# Patient Record
Sex: FEMALE | Race: WHITE | Employment: FULL TIME | URBAN - METROPOLITAN AREA
[De-identification: names, ages, dates, MRNs, and addresses within clinical notes are randomized per-mention and may not be internally consistent; named-entity substitution may affect disease eponyms.]

---

## 2017-01-07 PROCEDURE — 86480 TB TEST CELL IMMUN MEASURE: CPT | Performed by: FAMILY MEDICINE

## 2017-01-07 PROCEDURE — 36415 COLL VENOUS BLD VENIPUNCTURE: CPT | Performed by: FAMILY MEDICINE

## 2017-03-07 DIAGNOSIS — G43.711 INTRACTABLE CHRONIC MIGRAINE WITHOUT AURA AND WITH STATUS MIGRAINOSUS: Primary | ICD-10-CM

## 2017-03-08 NOTE — TELEPHONE ENCOUNTER
Medication: Topamax     Date of last refill: 5/2/16   [place]    Last office visit: 4/12/16  Due back to clinic per last office note: once Botox PA ed. Date next office visit scheduled: Patient cancelled Botox appt.

## 2017-03-21 NOTE — TELEPHONE ENCOUNTER
Attempted to reach patient but there was no answer and mailbox is full. Left a message with patient spouse to have patient give us a call back. Please help patient make appointment.

## 2017-03-22 RX ORDER — TOPIRAMATE 50 MG/1
TABLET, FILM COATED ORAL
Qty: 180 TABLET | Refills: 0 | OUTPATIENT
Start: 2017-03-22

## 2017-03-22 NOTE — TELEPHONE ENCOUNTER
Pt scheduled f/up for 3/31 in [facility]. Pt said she has enough medication to get her to appt. If she needs more she will contact office.

## 2017-03-31 ENCOUNTER — OFFICE VISIT (OUTPATIENT)
Dept: NEUROLOGY | Facility: CLINIC | Age: 52
End: 2017-03-31

## 2017-03-31 VITALS
SYSTOLIC BLOOD PRESSURE: 102 MMHG | WEIGHT: 137 LBS | HEART RATE: 66 BPM | RESPIRATION RATE: 18 BRPM | DIASTOLIC BLOOD PRESSURE: 62 MMHG | BODY MASS INDEX: 25 KG/M2

## 2017-03-31 DIAGNOSIS — G43.711 INTRACTABLE CHRONIC MIGRAINE WITHOUT AURA AND WITH STATUS MIGRAINOSUS: Primary | ICD-10-CM

## 2017-03-31 DIAGNOSIS — D35.2 PITUITARY ADENOMA (HCC): ICD-10-CM

## 2017-03-31 PROCEDURE — 99215 OFFICE O/P EST HI 40 MIN: CPT | Performed by: OTHER

## 2017-03-31 RX ORDER — TOPIRAMATE 50 MG/1
50 TABLET, FILM COATED ORAL 2 TIMES DAILY
Qty: 180 TABLET | Refills: 3 | Status: SHIPPED | OUTPATIENT
Start: 2017-03-31 | End: 2018-11-24

## 2017-03-31 NOTE — PATIENT INSTRUCTIONS
Refill policies:    • Allow 2 business days for refills; controlled substances may take longer.   • Contact your pharmacy at least 5 days prior to running out of medication and have them send an electronic request or submit request through the “request re insurance carrier to obtain pre-certification or prior authorization. Unfortunately, [facility] has seen an increase in denial of payment even though the procedure/test has been pre-certified.   You are strongly encouraged to contact your insurance carrier to v

## 2017-03-31 NOTE — PROGRESS NOTES
[facility] Neurology outpatient progress note  Date of service: 3/31/2017    Patient here for follow up for migraines. States headache is well controlled with current dose of topamax, denies side effect from topamax, she is on bromocriptine prescribed by lawrence ISAACS or equivalent per week         Comment: occasionally     Family History   Problem Relation Age of Onset   • Cancer Father      LUNG & COPD, SMOKING   • Lipids Mother    • Migraines Mother    • [place] Mother    • Stroke Mother    • Arrhythmia

## 2018-02-02 PROCEDURE — 84146 ASSAY OF PROLACTIN: CPT | Performed by: PHYSICIAN ASSISTANT

## 2018-02-02 PROCEDURE — 84305 ASSAY OF SOMATOMEDIN: CPT | Performed by: PHYSICIAN ASSISTANT

## 2018-02-19 ENCOUNTER — TELEPHONE (OUTPATIENT)
Dept: NEUROLOGY | Facility: CLINIC | Age: 53
End: 2018-02-19

## 2018-02-19 NOTE — TELEPHONE ENCOUNTER
Received Cancer registry form via mail from the [facility] requesting last follow up date and most current information on patient. Form initiated and placed on Dr Deana Finch desk for signature.      Once signed, form to be mailed in self [employer]

## 2018-03-07 PROCEDURE — 88175 CYTOPATH C/V AUTO FLUID REDO: CPT | Performed by: OBSTETRICS & GYNECOLOGY

## 2018-03-07 PROCEDURE — 87624 HPV HI-RISK TYP POOLED RSLT: CPT | Performed by: OBSTETRICS & GYNECOLOGY

## 2018-03-25 DIAGNOSIS — G43.711 INTRACTABLE CHRONIC MIGRAINE WITHOUT AURA AND WITH STATUS MIGRAINOSUS: ICD-10-CM

## 2018-03-26 NOTE — TELEPHONE ENCOUNTER
Due for yearly appt. Will sent Dormzy message and inform her that we would make sure she has enough medication to last until appt.      Medication: Topamax    Date of last refill: 3/31/17 for #180/3 additional refills  [place]

## 2018-03-27 NOTE — TELEPHONE ENCOUNTER
Left message for patient to give us a call back. Please help patient make an appointment. Then we can refill medication to the appointment date.

## 2018-04-05 RX ORDER — TOPIRAMATE 50 MG/1
TABLET, FILM COATED ORAL
Qty: 180 TABLET | OUTPATIENT
Start: 2018-04-05

## 2018-07-17 PROCEDURE — 84146 ASSAY OF PROLACTIN: CPT | Performed by: PHYSICIAN ASSISTANT

## 2018-07-17 PROCEDURE — 36415 COLL VENOUS BLD VENIPUNCTURE: CPT | Performed by: PHYSICIAN ASSISTANT

## 2018-11-21 PROCEDURE — 86480 TB TEST CELL IMMUN MEASURE: CPT | Performed by: FAMILY MEDICINE

## 2018-11-24 DIAGNOSIS — G43.711 INTRACTABLE CHRONIC MIGRAINE WITHOUT AURA AND WITH STATUS MIGRAINOSUS: ICD-10-CM

## 2018-11-26 NOTE — TELEPHONE ENCOUNTER
Sent the patient a IPDIA message to schedul an appointment before medication can be approved.        Medication: topiramate 50 MG Oral Tab     Date of last refill: 03/31/17 (#180/3)  [place]    Last office visit: 03

## 2018-12-04 ENCOUNTER — TELEPHONE (OUTPATIENT)
Dept: NEUROLOGY | Facility: CLINIC | Age: 53
End: 2018-12-04

## 2018-12-04 RX ORDER — TOPIRAMATE 50 MG/1
50 TABLET, FILM COATED ORAL 2 TIMES DAILY
Qty: 90 TABLET | Refills: 0 | Status: SHIPPED | OUTPATIENT
Start: 2018-12-04 | End: 2019-01-08

## 2019-01-08 ENCOUNTER — OFFICE VISIT (OUTPATIENT)
Dept: NEUROLOGY | Facility: CLINIC | Age: 54
End: 2019-01-08
Payer: COMMERCIAL

## 2019-01-08 VITALS — RESPIRATION RATE: 16 BRPM | HEART RATE: 66 BPM | SYSTOLIC BLOOD PRESSURE: 100 MMHG | DIASTOLIC BLOOD PRESSURE: 62 MMHG

## 2019-01-08 DIAGNOSIS — G43.711 INTRACTABLE CHRONIC MIGRAINE WITHOUT AURA AND WITH STATUS MIGRAINOSUS: ICD-10-CM

## 2019-01-08 DIAGNOSIS — D35.2 PITUITARY ADENOMA (HCC): Primary | ICD-10-CM

## 2019-01-08 PROCEDURE — 99215 OFFICE O/P EST HI 40 MIN: CPT | Performed by: OTHER

## 2019-01-08 RX ORDER — TOPIRAMATE 50 MG/1
50 TABLET, FILM COATED ORAL 2 TIMES DAILY
Qty: 180 TABLET | Refills: 3 | Status: SHIPPED | OUTPATIENT
Start: 2019-01-08 | End: 2020-03-23

## 2019-01-08 NOTE — PATIENT INSTRUCTIONS
Refill policies:    • Allow 2-3 business days for refills; controlled substances may take longer.   • Contact your pharmacy at least 5 days prior to running out of medication and have them send an electronic request or submit request through the “request re entire amount billed. Precertification and Prior Authorizations: If your physician has recommended that you have a procedure or additional testing performed.   [facility]) will contact your insurance carrier to obtain pre-certi

## 2019-01-08 NOTE — PROGRESS NOTES
[facility] Neurology outpatient progress note  Date of service: 1/8/2019    Patient here for follow up for migraine and pituitary macroadenoma which has enlarged from last year imaging repeat.  States headache is well controlled with current dose of topamax, denlinh Used    Alcohol use:  Yes      Alcohol/week: 0.0 oz      Comment: occasionally    Family History   Problem Relation Age of Onset   • Cancer Father         LUNG & COPD, SMOKING   • Lipids Mother    • Migraines Mother    • Stroke Mother    • Arrhythmia Mother counseling patient regarding all studies' results, assessment, treatment options and care plan.     Sahara Haywood MD  Neurology  [facility]  1/8/2019, 4:13 PM  Palmira Miller MD

## 2019-01-29 ENCOUNTER — TELEPHONE (OUTPATIENT)
Dept: NEUROLOGY | Facility: CLINIC | Age: 54
End: 2019-01-29

## 2019-01-29 DIAGNOSIS — Z91.041 CONTRAST MEDIA ALLERGY: Primary | ICD-10-CM

## 2019-01-29 RX ORDER — DIPHENHYDRAMINE HCL 50 MG
CAPSULE ORAL
Qty: 1 CAPSULE | Refills: 0 | Status: SHIPPED | OUTPATIENT
Start: 2019-01-29 | End: 2019-04-02 | Stop reason: ALTCHOICE

## 2019-01-29 RX ORDER — PREDNISONE 10 MG/1
TABLET ORAL
Qty: 15 TABLET | Refills: 0 | Status: SHIPPED | OUTPATIENT
Start: 2019-01-29 | End: 2019-04-02 | Stop reason: ALTCHOICE

## 2019-01-29 NOTE — TELEPHONE ENCOUNTER
Pt has contrast allergy listed on file. Confirmed with pt that she needs prep for contrast dye. Rx set up for review.      Per pt, no call back needed as long as it is sent

## 2019-01-31 ENCOUNTER — HOSPITAL ENCOUNTER (OUTPATIENT)
Dept: MRI IMAGING | Age: 54
Discharge: HOME | End: 2019-01-31
Attending: Other
Payer: COMMERCIAL

## 2019-01-31 DIAGNOSIS — D35.2 PITUITARY ADENOMA (HCC): ICD-10-CM

## 2019-01-31 DIAGNOSIS — G43.711 INTRACTABLE CHRONIC MIGRAINE WITHOUT AURA AND WITH STATUS MIGRAINOSUS: ICD-10-CM

## 2019-01-31 PROCEDURE — A9575 INJ GADOTERATE MEGLUMI 0.1ML: HCPCS | Performed by: OTHER

## 2019-01-31 PROCEDURE — 70553 MRI BRAIN STEM W/O & W/DYE: CPT | Performed by: OTHER

## 2019-02-08 ENCOUNTER — TELEPHONE (OUTPATIENT)
Dept: SURGERY | Facility: CLINIC | Age: 54
End: 2019-02-08

## 2019-03-12 ENCOUNTER — APPOINTMENT (OUTPATIENT)
Dept: HEMATOLOGY/ONCOLOGY | Facility: HOSPITAL | Age: 54
End: 2019-03-12
Attending: NEUROLOGICAL SURGERY

## 2019-03-19 ENCOUNTER — APPOINTMENT (OUTPATIENT)
Dept: HEMATOLOGY/ONCOLOGY | Facility: HOSPITAL | Age: 54
End: 2019-03-19

## 2019-03-27 ENCOUNTER — TELEPHONE (OUTPATIENT)
Dept: SURGERY | Facility: CLINIC | Age: 54
End: 2019-03-27

## 2019-03-27 NOTE — TELEPHONE ENCOUNTER
LMTCB, reminded pt of duration (3-4hrs), appt date & time, need for imaging, and location (provided address)

## 2019-04-02 ENCOUNTER — NURSE ONLY (OUTPATIENT)
Dept: HEMATOLOGY/ONCOLOGY | Facility: HOSPITAL | Age: 54
End: 2019-04-02
Attending: NEUROLOGICAL SURGERY
Payer: COMMERCIAL

## 2019-04-02 ENCOUNTER — OFFICE VISIT (OUTPATIENT)
Dept: NEUROLOGY | Facility: CLINIC | Age: 54
End: 2019-04-02
Payer: COMMERCIAL

## 2019-04-02 VITALS
DIASTOLIC BLOOD PRESSURE: 69 MMHG | WEIGHT: 122.38 LBS | OXYGEN SATURATION: 93 % | HEIGHT: 62.01 IN | RESPIRATION RATE: 18 BRPM | TEMPERATURE: 97 F | HEART RATE: 65 BPM | SYSTOLIC BLOOD PRESSURE: 108 MMHG | BODY MASS INDEX: 22.24 KG/M2

## 2019-04-02 DIAGNOSIS — D35.2 PITUITARY MACROADENOMA (HCC): Primary | ICD-10-CM

## 2019-04-02 PROCEDURE — 99211 OFF/OP EST MAY X REQ PHY/QHP: CPT

## 2019-04-02 PROCEDURE — 99204 OFFICE O/P NEW MOD 45 MIN: CPT | Performed by: PHYSICIAN ASSISTANT

## 2019-04-02 NOTE — H&P
Neurosurgery Clinic Visit  2019    Ronaldo Overton PCP:  Breann Keller MD    1965 MRN [id number]           REASON FOR VISIT: Pituitary adenoma follow up, last seen 2015, imaging review Neuro Conference      HISTORY OF PRESENT Jarrett Ledezma neurosurgery 2015, began Cabergoline 2/2015. 4/2015 Cabergoline d/c'd due to migranes and was started on Bromocriptine. Was seen 2016, was continued with Bromocriptine.  Was lost to endocrinology follow up until 2018, had been off Bromocriptine 6 - 12 month SPINE: Gait intact. Moving bilateral upper and lower extremities spontaneously to full resistance. SKIN: Warm, dry.       DIAGNOSTIC DATA: None       IMAGING:  PROCEDURE:  MRI PITUITARY (W+WO) (CPT=70553)     COMPARISON:  [place], MRI PITUITARY intracranial flow voids are present.     =====  CONCLUSION:  Heterogeneous hypo enhancing region of the right-sided pituitary gland extending into the cavernous sinus partially encasing the cavernous segment of the right internal carotid artery is stable. 1. 17 mm x 12 mm right-sided pituitary macroadenoma with invasion of the right cavernous sinus,  increased in size since November 8, 2016.  2. No evidence of suprasellar extension.       MRI PITUITARY WITHOUT AND WITH CONTRAST: 11/8/16  HISTORY/INDICAT craniopharyngeal duct (pouch) (HCC)      TECHNIQUE:  MRI imaging of the brain is performed prior to and then following IV infusion of intravenous gadolinium contrast. In addition, high-resolution multiplanar imaging of the pituitary gland is performed pre partially encasing the cavernous segment of the right internal carotid   artery. Dictated by: Johnny Haines MD on 4/24/2015 at 8:53       Approved by: Johnny Haines MD           PROCEDURE:  MR PITUITARY W/WO CONTRAST 12/11/14  COMPARISON:  None. Trace ethmoid mucosal thickening. The remainder of the visualized paranasal sinuses and mastoid air cells are unremarkable. The expected major intracranial flow voids are present.     =====  CONCLUSION:       1.  No acute intracranial abnormality identif with the patient. The patient agrees with the plan, verbalized understanding and is appreciative. All questions were sought out and thoroughly answered to satisfaction.        Total visit time: 45 min  More than 50% spent coordinating care, reviewing imagin

## 2019-05-02 NOTE — IMAGING NOTE
[email or web address] RADHA with call back number verifying information regarding MRI IV dye allergy.  [id number]

## 2019-05-03 ENCOUNTER — HOSPITAL ENCOUNTER (OUTPATIENT)
Dept: MRI IMAGING | Age: 54
Discharge: HOME | End: 2019-05-03
Attending: PHYSICIAN ASSISTANT
Payer: COMMERCIAL

## 2019-05-03 DIAGNOSIS — D35.2 PITUITARY MACROADENOMA (HCC): ICD-10-CM

## 2019-05-03 PROCEDURE — A9575 INJ GADOTERATE MEGLUMI 0.1ML: HCPCS | Performed by: PHYSICIAN ASSISTANT

## 2019-05-03 PROCEDURE — 70553 MRI BRAIN STEM W/O & W/DYE: CPT | Performed by: PHYSICIAN ASSISTANT

## 2019-05-07 ENCOUNTER — NURSE ONLY (OUTPATIENT)
Dept: HEMATOLOGY/ONCOLOGY | Facility: HOSPITAL | Age: 54
End: 2019-05-07
Attending: NEUROLOGICAL SURGERY
Payer: COMMERCIAL

## 2019-05-07 ENCOUNTER — OFFICE VISIT (OUTPATIENT)
Dept: NEUROLOGY | Facility: CLINIC | Age: 54
End: 2019-05-07
Payer: COMMERCIAL

## 2019-05-07 VITALS
OXYGEN SATURATION: 95 % | DIASTOLIC BLOOD PRESSURE: 73 MMHG | HEIGHT: 62.01 IN | BODY MASS INDEX: 22.02 KG/M2 | SYSTOLIC BLOOD PRESSURE: 106 MMHG | RESPIRATION RATE: 18 BRPM | TEMPERATURE: 97 F | WEIGHT: 121.19 LBS | HEART RATE: 77 BPM

## 2019-05-07 DIAGNOSIS — D35.2 PITUITARY MACROADENOMA (HCC): Primary | ICD-10-CM

## 2019-05-07 DIAGNOSIS — R23.2 HOT FLASHES: ICD-10-CM

## 2019-05-07 PROCEDURE — 99213 OFFICE O/P EST LOW 20 MIN: CPT | Performed by: PHYSICIAN ASSISTANT

## 2019-05-07 PROCEDURE — 99211 OFF/OP EST MAY X REQ PHY/QHP: CPT

## 2019-05-07 NOTE — PROGRESS NOTES
Neurosurgery Clinic Visit  2019    Tito Laboy PCP:  Claudia Delaney MD    1965 MRN [id number]           REASON FOR VISIT: Pituitary lesion follow up, imaging review Neuro Conference      HISTORY OF PRESENT ILLNESS:Mercedes Arriaga pituitary lesion or menopause. Just began experiencing hot flashes recently.      States endocrinology did not mention whether surgery or medication therapy was the best option.      Per records, PRL from 2009 then gap until 2015.  Regular PRL monitoring si taking differently: Take 2.5 mg by mouth daily.  ) Disp: 90 tablet Rfl: 1   Estradiol (ESTRACE) 0.1 MG/GM Vaginal Cream Use 1 g daily x 2 wks, then twice a week Disp: 1 Tube Rfl: 4     No current facility-administered medications on file prior to visit. leptomeningeal enhancement. There is no restricted diffusion to suggest acute ischemia/infarction. The visualized paranasal sinuses and mastoid air cells are unremarkable.        The expected major intracranial flow voids are present.      =====  CO the patient. The patient agrees with the plan, verbalized understanding and is appreciative. All questions were sought out and thoroughly answered to satisfaction. I scribed for Dr. Miguel Lopez.        Total visit time: > 20 min  More than 50% spent coordinating

## 2019-06-06 PROCEDURE — 84305 ASSAY OF SOMATOMEDIN: CPT | Performed by: INTERNAL MEDICINE

## 2019-11-25 ENCOUNTER — TELEPHONE (OUTPATIENT)
Dept: SURGERY | Facility: CLINIC | Age: 54
End: 2019-11-25

## 2019-11-25 DIAGNOSIS — D35.2 PITUITARY ADENOMA (HCC): Primary | ICD-10-CM

## 2019-11-25 RX ORDER — DIPHENHYDRAMINE HCL 25 MG
50 CAPSULE ORAL SEE ADMIN INSTRUCTIONS
Status: SHIPPED | OUTPATIENT
Start: 2019-11-25

## 2019-11-25 RX ORDER — PREDNISONE 20 MG/1
10 TABLET ORAL ONCE
Status: SHIPPED | OUTPATIENT
Start: 2019-11-25

## 2019-11-25 NOTE — TELEPHONE ENCOUNTER
pt having MRI on 12/3/19, allergic to iodine, always gets premedicated.  Please call in meds for patient & let her know

## 2019-11-26 NOTE — TELEPHONE ENCOUNTER
I withdrew the previous auth and resubmitted for a new auth that covers her scheduled DOS.  I called the patient and l/m vc mail (ok per verbal) that [facility]

## 2019-12-02 ENCOUNTER — TELEPHONE (OUTPATIENT)
Dept: SURGERY | Facility: CLINIC | Age: 54
End: 2019-12-02

## 2019-12-02 NOTE — TELEPHONE ENCOUNTER
Pharmacy never got rx; [facility] [address] Fax # [phone]. Please call patient if she can take it 13 hours or 24 hours before.

## 2019-12-02 NOTE — TELEPHONE ENCOUNTER
Called patient to inform prescriptions did not arrive at pharmacy. Explained to patient it would be ideal if she would reschedule being that she will require prepping.   Patient stated her MRI is not until 4:40 tomorrow afternoon leaving her adequate time

## 2019-12-03 ENCOUNTER — HOSPITAL ENCOUNTER (OUTPATIENT)
Dept: MRI IMAGING | Age: 54
Discharge: HOME | End: 2019-12-03
Attending: PHYSICIAN ASSISTANT
Payer: COMMERCIAL

## 2019-12-03 ENCOUNTER — TELEPHONE (OUTPATIENT)
Dept: NEUROLOGY | Facility: CLINIC | Age: 54
End: 2019-12-03

## 2019-12-03 DIAGNOSIS — D35.2 PITUITARY MACROADENOMA (HCC): ICD-10-CM

## 2019-12-03 PROCEDURE — A9575 INJ GADOTERATE MEGLUMI 0.1ML: HCPCS | Performed by: PHYSICIAN ASSISTANT

## 2019-12-03 PROCEDURE — 70553 MRI BRAIN STEM W/O & W/DYE: CPT | Performed by: PHYSICIAN ASSISTANT

## 2019-12-03 NOTE — TELEPHONE ENCOUNTER
Spoke to patient regarding Benadryl and Prednisone and her concern being MRI is delayed. Confirmed with Simba LOZADA, patient is fine to still have imaging, medication will remain in her system for the day.

## 2020-02-24 DIAGNOSIS — G43.711 INTRACTABLE CHRONIC MIGRAINE WITHOUT AURA AND WITH STATUS MIGRAINOSUS: ICD-10-CM

## 2020-02-24 RX ORDER — TOPIRAMATE 50 MG/1
50 TABLET, FILM COATED ORAL 2 TIMES DAILY
Qty: 180 TABLET | Refills: 3 | Status: CANCELLED | OUTPATIENT
Start: 2020-02-24

## 2020-02-24 NOTE — TELEPHONE ENCOUNTER
Left pt VM (ok per HIPAA) that she needs f/u appt.      Medication: topiramate 50 mg     Date of last refill: 1/8/19 (#180/3)  [place]    Last office visit: 1/8/19  Due back to clinic per last office note:  1 year   Da

## 2020-03-23 DIAGNOSIS — G43.711 INTRACTABLE CHRONIC MIGRAINE WITHOUT AURA AND WITH STATUS MIGRAINOSUS: ICD-10-CM

## 2020-03-23 RX ORDER — TOPIRAMATE 50 MG/1
50 TABLET, FILM COATED ORAL 2 TIMES DAILY
Qty: 180 TABLET | Refills: 0 | Status: SHIPPED | OUTPATIENT
Start: 2020-03-23 | End: 2020-05-11

## 2020-03-23 NOTE — TELEPHONE ENCOUNTER
Medication: Topamax    Date of last refill: 1/8/19 (#180/3)  [place]     Last office visit:1/8/19  Due back to clinic per last office note:  1 year  Date next office visit scheduled:    Future Appointments   Date Time Pr

## 2020-05-11 ENCOUNTER — VIRTUAL PHONE E/M (OUTPATIENT)
Dept: NEUROLOGY | Facility: CLINIC | Age: 55
End: 2020-05-11
Payer: COMMERCIAL

## 2020-05-11 DIAGNOSIS — G43.711 INTRACTABLE CHRONIC MIGRAINE WITHOUT AURA AND WITH STATUS MIGRAINOSUS: ICD-10-CM

## 2020-05-11 DIAGNOSIS — D35.2 PITUITARY ADENOMA (HCC): Primary | ICD-10-CM

## 2020-05-11 PROCEDURE — 99214 OFFICE O/P EST MOD 30 MIN: CPT | Performed by: OTHER

## 2020-05-11 RX ORDER — TOPIRAMATE 50 MG/1
50 TABLET, FILM COATED ORAL 2 TIMES DAILY
Qty: 180 TABLET | Refills: 1 | Status: SHIPPED | OUTPATIENT
Start: 2020-05-11 | End: 2021-07-19

## 2020-05-11 NOTE — PROGRESS NOTES
Virtual/Telephone Visit Note     Date of service: 5/11/2020    Renny Shruti verbally consents to a Virtual/Telephone Visit service on 5/11/2020.   Patient understands and accepts financial responsibility for any deductible, co-insurance and/or co-p • Prolactin increased    • SEASONAL ALLERGIES      Past Surgical History:   Procedure Laterality Date   • D & C     • LASIK Bilateral    •       x1,      Social History:  Social History    Tobacco Use      Smoking status: Never Smoker      Sm relationship, due to the ongoing public health crisis/national emergency and because of restrictions of visitation. There are limitations of this visit as no physical exam could be performed.   Every conscious effort was taken to allow for sufficient and a

## 2020-07-10 ENCOUNTER — TELEPHONE (OUTPATIENT)
Dept: NEUROLOGY | Facility: CLINIC | Age: 55
End: 2020-07-10

## 2020-07-10 DIAGNOSIS — Z91.041 CONTRAST MEDIA ALLERGY: Primary | ICD-10-CM

## 2020-07-10 RX ORDER — PREDNISONE 10 MG/1
TABLET ORAL
Qty: 15 TABLET | Refills: 0 | Status: SHIPPED | OUTPATIENT
Start: 2020-07-10 | End: 2020-07-23

## 2020-07-10 RX ORDER — DIPHENHYDRAMINE HCL 25 MG
TABLET ORAL
Qty: 2 TABLET | Refills: 0 | Status: SHIPPED | OUTPATIENT
Start: 2020-07-10 | End: 2020-08-07

## 2020-07-10 NOTE — IMAGING NOTE
07/10 @ 1102 Pt cannot recall reactions she developed from CT or MRI Radiology  IV contrast dye. She has always been premedicated then. Made her aware CT contrast is not the same as MRI contrast. Dr Nabor Marquez office contacted and I spoke to [facility] about this.

## 2020-07-10 NOTE — TELEPHONE ENCOUNTER
Spoke with Suhas Gutiérrez in radiology department who states patient is requesting contrast allergy protocol for MRI Pituitary that is scheduled on 7/14/20. Rx pended - see below    Take 50 mg prednisone 13, 7 and 1 hour prior to MRI.    Take 50 mg benadryl 1 hour

## 2020-07-14 ENCOUNTER — HOSPITAL ENCOUNTER (OUTPATIENT)
Dept: MRI IMAGING | Age: 55
Discharge: HOME | End: 2020-07-14
Attending: Other
Payer: COMMERCIAL

## 2020-07-14 DIAGNOSIS — G43.711 INTRACTABLE CHRONIC MIGRAINE WITHOUT AURA AND WITH STATUS MIGRAINOSUS: ICD-10-CM

## 2020-07-14 DIAGNOSIS — D35.2 PITUITARY ADENOMA (HCC): ICD-10-CM

## 2020-07-14 PROCEDURE — A9575 INJ GADOTERATE MEGLUMI 0.1ML: HCPCS | Performed by: OTHER

## 2020-07-14 PROCEDURE — 70553 MRI BRAIN STEM W/O & W/DYE: CPT | Performed by: OTHER

## 2020-07-31 PROCEDURE — 88305 TISSUE EXAM BY PATHOLOGIST: CPT | Performed by: RADIOLOGY

## 2020-08-03 PROCEDURE — 88305 TISSUE EXAM BY PATHOLOGIST: CPT | Performed by: INTERNAL MEDICINE

## 2020-12-07 ENCOUNTER — TELEPHONE (OUTPATIENT)
Dept: NEUROLOGY | Facility: CLINIC | Age: 55
End: 2020-12-07

## 2020-12-07 DIAGNOSIS — D35.2 PITUITARY ADENOMA (HCC): Primary | ICD-10-CM

## 2020-12-07 NOTE — TELEPHONE ENCOUNTER
Per PSR: Pt is requestiing an MRI pituitary order      L. O.V 5/11/2020-    Assessment & Plan:  (D35.2) Pituitary adenoma ([facility] [address]  (primary encounter diagnosis)  (G43.711) Intractable chronic migraine without aura and with status migrainosus  (primary encount

## 2020-12-08 NOTE — TELEPHONE ENCOUNTER
Message left on VM (ok per HIPAA consent)that order for MRI Pituitary has been placed and she can schedule an appointment after she hears from PA dept that it has been approved by her insurance.

## 2020-12-21 ENCOUNTER — TELEPHONE (OUTPATIENT)
Dept: NEUROLOGY | Facility: CLINIC | Age: 55
End: 2020-12-21

## 2020-12-21 DIAGNOSIS — Z91.041 CONTRAST MEDIA ALLERGY: Primary | ICD-10-CM

## 2020-12-21 DIAGNOSIS — F41.8 TEST ANXIETY: ICD-10-CM

## 2020-12-21 RX ORDER — LORAZEPAM 1 MG/1
TABLET ORAL
Qty: 2 TABLET | Refills: 0 | Status: SHIPPED | OUTPATIENT
Start: 2020-12-21 | End: 2021-01-22 | Stop reason: CLARIF

## 2020-12-21 NOTE — TELEPHONE ENCOUNTER
Attempted to call pt to relay that Rx was sent to pharmacy, mail box is full and could not leave message. Will try again later.

## 2020-12-22 NOTE — TELEPHONE ENCOUNTER
Pt returning Eryn RN's call; I relayed info to pt that her Rx is ready for  and to make sure she has someone drive her to and from her MRI. Pt understood.

## 2020-12-22 NOTE — TELEPHONE ENCOUNTER
Pt picked up her Rx but states this time is a different medication. Pt needs medication for her iodine allergy, not for relaxation. Informed pt that Pao Sands RN will have the prednisone sent to her pharmacy.

## 2020-12-22 NOTE — TELEPHONE ENCOUNTER
Attempted to call pt, again not able to reach her and could not leave message as mailbox is full. Is listed as active on [employer]. Will also try sending message there as well.

## 2020-12-23 ENCOUNTER — TELEPHONE (OUTPATIENT)
Dept: NEUROLOGY | Facility: CLINIC | Age: 55
End: 2020-12-23

## 2020-12-23 RX ORDER — PREDNISONE 10 MG/1
TABLET ORAL
Qty: 15 TABLET | Refills: 0 | Status: SHIPPED | OUTPATIENT
Start: 2020-12-23 | End: 2021-07-19 | Stop reason: ALTCHOICE

## 2020-12-23 NOTE — TELEPHONE ENCOUNTER
pt has an MRI scheduled for tomorrow; pt states that a coworker just tested positive for Covid; pt hasn't seen coworker since last Friday and they are always wearing maska

## 2020-12-31 ENCOUNTER — HOSPITAL ENCOUNTER (OUTPATIENT)
Dept: MRI IMAGING | Age: 55
Discharge: HOME | End: 2020-12-31
Attending: Other
Payer: COMMERCIAL

## 2020-12-31 DIAGNOSIS — D35.2 PITUITARY ADENOMA (HCC): ICD-10-CM

## 2020-12-31 PROCEDURE — A9575 INJ GADOTERATE MEGLUMI 0.1ML: HCPCS | Performed by: OTHER

## 2020-12-31 PROCEDURE — 70553 MRI BRAIN STEM W/O & W/DYE: CPT | Performed by: OTHER

## 2021-07-16 DIAGNOSIS — G43.711 INTRACTABLE CHRONIC MIGRAINE WITHOUT AURA AND WITH STATUS MIGRAINOSUS: ICD-10-CM

## 2021-07-16 NOTE — TELEPHONE ENCOUNTER
MAITE on VM (ok per HIPAA consent) to call back before 4pm or advised ER or UC for severe symptoms as the Topamax will not help her migraine now if she has been off of it for a while.       Medication: TOPIRAMATE 50 MG    Date of last refill: 5/11/20 (#180/

## 2021-07-16 NOTE — TELEPHONE ENCOUNTER
Pt calling for status of the topiramate, she has a migraine right now. She made an appointment for this Monday the 19th, but if you can send her a small amount to get to Monday, please do.  If not, no need to call (her phone is off now anyway.)

## 2021-07-19 ENCOUNTER — OFFICE VISIT (OUTPATIENT)
Dept: NEUROLOGY | Facility: CLINIC | Age: 56
End: 2021-07-19
Payer: COMMERCIAL

## 2021-07-19 ENCOUNTER — TELEPHONE (OUTPATIENT)
Dept: NEUROLOGY | Facility: CLINIC | Age: 56
End: 2021-07-19

## 2021-07-19 VITALS — SYSTOLIC BLOOD PRESSURE: 108 MMHG | DIASTOLIC BLOOD PRESSURE: 74 MMHG | HEART RATE: 68 BPM | RESPIRATION RATE: 16 BRPM

## 2021-07-19 DIAGNOSIS — D35.2 PITUITARY ADENOMA (HCC): Primary | ICD-10-CM

## 2021-07-19 DIAGNOSIS — G43.711 INTRACTABLE CHRONIC MIGRAINE WITHOUT AURA AND WITH STATUS MIGRAINOSUS: ICD-10-CM

## 2021-07-19 PROCEDURE — 3078F DIAST BP <80 MM HG: CPT | Performed by: OTHER

## 2021-07-19 PROCEDURE — 99214 OFFICE O/P EST MOD 30 MIN: CPT | Performed by: OTHER

## 2021-07-19 PROCEDURE — 3074F SYST BP LT 130 MM HG: CPT | Performed by: OTHER

## 2021-07-19 RX ORDER — TOPIRAMATE 50 MG/1
TABLET, FILM COATED ORAL
Qty: 180 TABLET | Refills: 1 | OUTPATIENT
Start: 2021-07-19

## 2021-07-19 RX ORDER — TOPIRAMATE 50 MG/1
50 TABLET, FILM COATED ORAL 2 TIMES DAILY
Qty: 180 TABLET | Refills: 1 | Status: CANCELLED | OUTPATIENT
Start: 2021-07-19

## 2021-07-19 RX ORDER — RIMEGEPANT SULFATE 75 MG/75MG
75 TABLET, ORALLY DISINTEGRATING ORAL AS NEEDED
Qty: 8 TABLET | Refills: 0 | Status: SHIPPED | OUTPATIENT
Start: 2021-07-19 | End: 2021-08-18

## 2021-07-19 RX ORDER — TOPIRAMATE 50 MG/1
50 TABLET, FILM COATED ORAL DAILY
Qty: 90 TABLET | Refills: 3 | Status: SHIPPED | OUTPATIENT
Start: 2021-07-19

## 2021-07-19 NOTE — PROGRESS NOTES
[facility] Neurology Outpatient Progress Note  Date of service: 7/19/2021    Patient here for a follow-up visit for migraine and history of pituitary tumor. Since last visit migraine is worse. No visual field loss. She takes only once a day topamax.  But ran out by mouth 2 (two) times daily.  (Patient not taking: Reported on 7/19/2021 ), Disp: 180 tablet, Rfl: 1  Allergies:    Gnp Iodine              ANAPHYLAXIS  Radiology Contrast *    ANAPHYLAXIS, SHORTNESS OF BREATH  Dander                      Comment:cats  Dus Endo  MRI repeat needed and see Neurosurgery after that  Orders Placed This Encounter          MRI PITUITARY (W+WO)(CPT=70553) ([facility])     RTC 1 year  Pt is advised to go ER for any new or worsening headache, visual loss or other neurological symptoms.

## 2021-07-20 DIAGNOSIS — Z91.041 CONTRAST MEDIA ALLERGY: ICD-10-CM

## 2021-07-20 NOTE — TELEPHONE ENCOUNTER
Brayden Braun MD  [address] [place] [address] Nurse  Could you please help order pre-med for pt's allergy history per radiology? Thanks,. Jaden Murillo       See TE 7/19/21, indicates that MRI repeat to be done in December. LOV states that repeat MRI needed.  Confirme

## 2021-08-04 ENCOUNTER — APPOINTMENT (OUTPATIENT)
Dept: OTHER | Facility: HOSPITAL | Age: 56
End: 2021-08-04
Attending: PREVENTIVE MEDICINE

## 2021-08-06 ENCOUNTER — APPOINTMENT (OUTPATIENT)
Dept: OTHER | Facility: HOSPITAL | Age: 56
End: 2021-08-06
Attending: PREVENTIVE MEDICINE

## 2021-11-22 RX ORDER — PREDNISONE 10 MG/1
TABLET ORAL
Qty: 15 TABLET | Refills: 0 | Status: CANCELLED | OUTPATIENT
Start: 2021-11-22

## 2021-11-23 RX ORDER — PREDNISONE 10 MG/1
TABLET ORAL
Qty: 15 TABLET | Refills: 0 | Status: SHIPPED | OUTPATIENT
Start: 2021-11-23

## 2021-12-22 ENCOUNTER — TELEPHONE (OUTPATIENT)
Dept: SURGERY | Facility: CLINIC | Age: 56
End: 2021-12-22

## 2021-12-22 DIAGNOSIS — D35.2 PITUITARY ADENOMA (HCC): Primary | ICD-10-CM

## 2021-12-22 RX ORDER — PREDNISONE 10 MG/1
TABLET ORAL
Qty: 15 TABLET | Refills: 0 | Status: SHIPPED | OUTPATIENT
Start: 2021-12-22

## 2021-12-22 NOTE — TELEPHONE ENCOUNTER
Patient would like to speak with Nurse in regards to medication prior to patients MRI scheduled for 21. Patient also states her MRI order  yesterday and is needing it extended to her appointment date.  Please contact to assist.

## 2021-12-22 NOTE — TELEPHONE ENCOUNTER
Unfortunately [facility] does not extend PA dates. I started a new PA with [facility]. New PA is now noted in referral.  Authorization # [id number] @ [address] [place] [address] dates: 12/22/21 - 01/20/22. Please advise patient she is all set with new PA.   Thank y

## 2021-12-22 NOTE — TELEPHONE ENCOUNTER
Pt states she is not sure if CT contrast and MRI contrast are the same, if so, will need pre-meds for anaphylactic reaction in past.    For PA: Can we extend auth dates? Pt was unable to schedule within window.

## 2021-12-23 NOTE — TELEPHONE ENCOUNTER
Spoke with pt. Relayed that premedication is at pharmacy and that [address] MRI now expires in 1/2022. Verbalized understanding. Is going to call radiology to make sure she needs to be premedicated.  Wanted to talk with them on what dye they will be usi

## 2021-12-23 NOTE — TELEPHONE ENCOUNTER
Pt called and would like to speak to a nurse regarding medication prescribed to take prior to MRI scheduled on 12/27/21. Please advise.

## 2023-09-19 ENCOUNTER — WORKER'S COMP (OUTPATIENT)
Dept: OCCUPATIONAL MEDICINE | Age: 58
End: 2023-09-19

## 2023-09-19 VITALS
SYSTOLIC BLOOD PRESSURE: 98 MMHG | WEIGHT: 120 LBS | HEART RATE: 68 BPM | DIASTOLIC BLOOD PRESSURE: 56 MMHG | HEIGHT: 62 IN | BODY MASS INDEX: 22.08 KG/M2

## 2023-09-19 DIAGNOSIS — M79.642 LEFT HAND PAIN: Primary | ICD-10-CM

## 2023-09-19 DIAGNOSIS — S60.00XA CONTUSION OF FINGER OF LEFT HAND, UNSPECIFIED FINGER, INITIAL ENCOUNTER: ICD-10-CM

## 2023-09-19 PROCEDURE — 99203 OFFICE O/P NEW LOW 30 MIN: CPT | Performed by: NURSE PRACTITIONER

## 2023-09-19 ASSESSMENT — PAIN SCALES - GENERAL: PAINLEVEL: 4

## 2023-09-20 ENCOUNTER — IMAGING SERVICES (OUTPATIENT)
Dept: GENERAL RADIOLOGY | Age: 58
End: 2023-09-20

## 2023-09-20 ENCOUNTER — WORKER'S COMP (OUTPATIENT)
Dept: OCCUPATIONAL MEDICINE | Age: 58
End: 2023-09-20

## 2023-09-20 ENCOUNTER — APPOINTMENT (OUTPATIENT)
Dept: OCCUPATIONAL MEDICINE | Age: 58
End: 2023-09-20

## 2023-09-20 VITALS
WEIGHT: 120 LBS | TEMPERATURE: 98.7 F | SYSTOLIC BLOOD PRESSURE: 100 MMHG | OXYGEN SATURATION: 97 % | RESPIRATION RATE: 16 BRPM | HEIGHT: 62 IN | BODY MASS INDEX: 22.08 KG/M2 | HEART RATE: 78 BPM | DIASTOLIC BLOOD PRESSURE: 62 MMHG

## 2023-09-20 DIAGNOSIS — M79.642 LEFT HAND PAIN: Primary | ICD-10-CM

## 2023-09-20 DIAGNOSIS — S62.92XA: ICD-10-CM

## 2023-09-20 DIAGNOSIS — S60.00XA CONTUSION OF FINGER OF LEFT HAND, UNSPECIFIED FINGER, INITIAL ENCOUNTER: ICD-10-CM

## 2023-09-20 DIAGNOSIS — M79.642 LEFT HAND PAIN: ICD-10-CM

## 2023-09-20 PROCEDURE — 99213 OFFICE O/P EST LOW 20 MIN: CPT | Performed by: NURSE PRACTITIONER

## 2023-09-20 PROCEDURE — A4570 SPLINT: HCPCS | Performed by: NURSE PRACTITIONER

## 2023-09-20 ASSESSMENT — PAIN SCALES - GENERAL: PAINLEVEL: 2

## 2023-11-21 ENCOUNTER — TELEPHONE (OUTPATIENT)
Dept: NEUROLOGY | Facility: CLINIC | Age: 58
End: 2023-11-21

## 2023-11-21 NOTE — TELEPHONE ENCOUNTER
Pt was last seen 7/19/2021. She scheduled first available f/u appt 2/21/2024 for migraines and pituitary. She is requesting an order for MRI PITUITARY (W+WO). She would like to complete it before the end of the year. Please advise, Pt's best call back number is [phone]. Endorsed to RN for Provider.

## 2023-11-22 NOTE — TELEPHONE ENCOUNTER
Left message for patient. Advised that patient has not been seen since 2021 and will need to have an appointment before an MRI can be ordered. Patient advised she is on the waiting list however nothing sooner is currently available.

## 2024-02-21 ENCOUNTER — OFFICE VISIT (OUTPATIENT)
Dept: NEUROLOGY | Facility: CLINIC | Age: 59
End: 2024-02-21
Payer: COMMERCIAL

## 2024-02-21 VITALS
DIASTOLIC BLOOD PRESSURE: 72 MMHG | HEART RATE: 78 BPM | HEIGHT: 62 IN | WEIGHT: 135 LBS | BODY MASS INDEX: 24.84 KG/M2 | SYSTOLIC BLOOD PRESSURE: 110 MMHG | RESPIRATION RATE: 18 BRPM | OXYGEN SATURATION: 98 %

## 2024-02-21 DIAGNOSIS — D35.2 PITUITARY ADENOMA (HCC): Primary | ICD-10-CM

## 2024-02-21 PROCEDURE — 3074F SYST BP LT 130 MM HG: CPT | Performed by: OTHER

## 2024-02-21 PROCEDURE — 3078F DIAST BP <80 MM HG: CPT | Performed by: OTHER

## 2024-02-21 PROCEDURE — 3008F BODY MASS INDEX DOCD: CPT | Performed by: OTHER

## 2024-02-21 PROCEDURE — 99244 OFF/OP CNSLTJ NEW/EST MOD 40: CPT | Performed by: OTHER

## 2024-02-21 RX ORDER — ABALOPARATIDE 2000 UG/ML
INJECTION, SOLUTION SUBCUTANEOUS
COMMUNITY

## 2024-02-21 RX ORDER — NABUMETONE 500 MG/1
1 TABLET, FILM COATED ORAL 2 TIMES DAILY
COMMUNITY

## 2024-02-21 NOTE — PROGRESS NOTES
[facility] OUTPATIENT NEUROLOGY CONSULTATION    Date of consult: 2/21/2024    Assessment:    ICD-10-CM    1. Pituitary adenoma (HCC)  D35.2 MRI PITUITARY (W+WO) (CPT=70553)        Plan:      Procedures    MRI PITUITARY (W+WO) (CPT=70553)   Endo to follow  Recommend eye exam annually  See orders and medications filed with this encounter. The patient indicates understanding of these issues and agrees with the plan.  RTC 1 year  Pt should go ER for any new or worsening symptoms and contact office     Subjective:   CC/Reason for consult: pituitary adenoma     HPI: Mercedes Bacon is a 58 year old female with past medical history as listed below presents here for initial evaluation of pituitary adenoma. Pt last saw me 4 years ago for pituitary adenoma and migraine, she has self stopped topamax due to concern of osteopenia. She is following with endocrinologist who prescribes bromocriptine; No new focal weakness, numbness, gait imbalance, vision or speech difficulties.    History/Other:   REVIEW OF SYSTEMS:  A comprehensive 14-point system was reviewed. Pertinent positives and negatives are noted in HPI.       Current Outpatient Medications:     Abaloparatide (TYMLOS) 3120 MCG/1.56ML Subcutaneous Solution Pen-injector, Inject into the skin., Disp: , Rfl:     bromocriptine 2.5 MG Oral Tab, Take 1 tablet (2.5 mg total) by mouth daily., Disp: 90 tablet, Rfl: 0    predniSONE 10 MG Oral Tab, Take Prednisone 50 mg 13 hours, 7 hours and 1 hour before exam, take 3rd prednisone along with diphenhydramine 50 mg. Do not drive., Disp: 15 tablet, Rfl: 0    predniSONE 10 MG Oral Tab, Take 5 tablets 13 hours, 7 hours, and 1 hour before MRI, Disp: 15 tablet, Rfl: 0    diphenhydrAMINE HCl 50 MG Oral Tab, Take 1 tablet 1 hour prior to MRI, Disp: 1 tablet, Rfl: 0    SIMVASTATIN 20 MG Oral Tab, TAKE 1 TABLET BY MOUTH EVERY DAY AT NIGHT, Disp: 90 tablet, Rfl: 0    Calcium-Magnesium-Zinc 333-133-5 MG Oral Tab, Take by mouth 2 (two) times a  day., Disp: , Rfl:     Multiple Vitamins-Minerals (ONE-A-DAY WOMENS OR), Take by mouth daily., Disp: , Rfl:     Cholecalciferol (VITAMIN D3) 25 MCG (1000 UT) Oral Cap, Take 1 tablet by mouth daily., Disp: , Rfl:     nabumetone 500 MG Oral Tab, Take 1 tablet (500 mg total) by mouth 2 (two) times daily., Disp: , Rfl:   Allergies:  Allergies   Allergen Reactions    Gnp Iodine ANAPHYLAXIS    Radiology Contrast Iodinated Dyes ANAPHYLAXIS and SHORTNESS OF BREATH    Dander      cats    Dust     Seasonal      Past Medical History:   Diagnosis Date    ANXIETY     Esophageal reflux     GERD     HEADACHES     MIGRAINES    Migraines     Osteopenia 02/15/2021    Other and unspecified hyperlipidemia     OTHER DISEASES     SEVERE PMS SXS    OTHER DISEASES     HX OF HIGH PROLACTIN LEVEL    Prolactin increased     SEASONAL ALLERGIES      Past Surgical History:   Procedure Laterality Date    COLONOSCOPY  2020    MAC: 3 mm rectosigmoid polyp (lymphoid aggregate), int hem, rpt     D & C      LASIK Bilateral           x1,      Social History:  Social History     Tobacco Use    Smoking status: Never    Smokeless tobacco: Never   Substance Use Topics    Alcohol use: Yes     Comment: occasionally     Family History   Problem Relation Age of Onset    Cancer Father         LUNG & COPD, SMOKING    Lipids Mother     Migraines Mother     Stroke Mother     Arrhythmia Mother     Heart Disease Sister     Other (tia) Sister      Objective:   Physical Examination:  /72   Pulse 78   Resp 18   Ht 62\"   Wt 135 lb (61.2 kg)   SpO2 98%   BMI 24.69 kg/m²   General: Awake and alert; in no acute distress  HEENT: Eye sclerae are anicteric; scalp is atraumatic  Neck: Supple  Cardiac: Regular rate and regular rhythm  Lungs: Clear   Abdomen:  non-tender  Extremities: No clubbing or cyanosis; moves extremities   Psychiatric: Normal mood and affect; answers questions appropriately  Dermatologic: No rashes; no edema  Neurological  Examination:  Language: normal   Speech: no dysarthria  CN: II-XII intact  Motor strength: 5/5 all extremities  Tone: normal  DTRs: 2+ symmetric  Coordination: Normal FTN  Sensory: symmetric   Gait: nl    Data Reviewed on 2/21/2024  Notes Reviewed on 2/21/2024  Labs Reviewed  on 2/21/2024    Hafsa Nathan MD (Michael)   Neurology  [facility]  2/21/2024, 2:34 PM  Consultation Report: being sent/fax/route to requesting provider   CC: Chong Kline MD

## 2024-04-30 ENCOUNTER — HOSPITAL ENCOUNTER (OUTPATIENT)
Dept: MRI IMAGING | Age: 59
Discharge: HOME | End: 2024-04-30
Attending: Other
Payer: COMMERCIAL

## 2024-04-30 DIAGNOSIS — D35.2 PITUITARY ADENOMA (HCC): ICD-10-CM

## 2024-04-30 PROCEDURE — A9575 INJ GADOTERATE MEGLUMI 0.1ML: HCPCS | Performed by: OTHER

## 2024-04-30 PROCEDURE — 70553 MRI BRAIN STEM W/O & W/DYE: CPT | Performed by: OTHER

## 2024-04-30 RX ORDER — GADOTERATE MEGLUMINE 376.9 MG/ML
15 INJECTION INTRAVENOUS
Status: COMPLETED | OUTPATIENT
Start: 2024-04-30 | End: 2024-04-30

## 2024-04-30 RX ADMIN — GADOTERATE MEGLUMINE 15 ML: 376.9 INJECTION INTRAVENOUS at 16:24:00

## 2025-03-14 ENCOUNTER — HOSPITAL ENCOUNTER (EMERGENCY)
Facility: HOSPITAL | Age: 60
Discharge: HOME | End: 2025-03-14
Attending: STUDENT IN AN ORGANIZED HEALTH CARE EDUCATION/TRAINING PROGRAM
Payer: COMMERCIAL

## 2025-03-14 ENCOUNTER — APPOINTMENT (OUTPATIENT)
Dept: GENERAL RADIOLOGY | Facility: HOSPITAL | Age: 60
End: 2025-03-14
Payer: COMMERCIAL

## 2025-03-14 VITALS
OXYGEN SATURATION: 100 % | BODY MASS INDEX: 24.55 KG/M2 | HEART RATE: 66 BPM | DIASTOLIC BLOOD PRESSURE: 74 MMHG | SYSTOLIC BLOOD PRESSURE: 102 MMHG | WEIGHT: 130 LBS | RESPIRATION RATE: 20 BRPM | HEIGHT: 61 IN | TEMPERATURE: 99 F

## 2025-03-14 DIAGNOSIS — R07.89 CHEST PAIN, ATYPICAL: Primary | ICD-10-CM

## 2025-03-14 LAB
ALBUMIN SERPL-MCNC: 4.8 G/DL (ref 3.2–4.8)
ALBUMIN/GLOB SERPL: 2.1 {RATIO} (ref 1–2)
ALP LIVER SERPL-CCNC: 112 U/L
ALT SERPL-CCNC: 23 U/L
ANION GAP SERPL CALC-SCNC: 7 MMOL/L (ref 0–18)
AST SERPL-CCNC: 22 U/L (ref ?–34)
ATRIAL RATE: 64 BPM
ATRIAL RATE: 73 BPM
BASOPHILS # BLD AUTO: 0.04 X10(3) UL (ref 0–0.2)
BASOPHILS NFR BLD AUTO: 0.6 %
BILIRUB SERPL-MCNC: 0.7 MG/DL (ref 0.3–1.2)
BUN BLD-MCNC: 15 MG/DL (ref 9–23)
CALCIUM BLD-MCNC: 9.6 MG/DL (ref 8.7–10.6)
CHLORIDE SERPL-SCNC: 104 MMOL/L (ref 98–112)
CO2 SERPL-SCNC: 29 MMOL/L (ref 21–32)
CREAT BLD-MCNC: 0.77 MG/DL
EGFRCR SERPLBLD CKD-EPI 2021: 89 ML/MIN/1.73M2 (ref 60–?)
EOSINOPHIL # BLD AUTO: 0.54 X10(3) UL (ref 0–0.7)
EOSINOPHIL NFR BLD AUTO: 8.1 %
ERYTHROCYTE [DISTWIDTH] IN BLOOD BY AUTOMATED COUNT: 12.5 %
GLOBULIN PLAS-MCNC: 2.3 G/DL (ref 2–3.5)
GLUCOSE BLD-MCNC: 96 MG/DL (ref 70–99)
HCT VFR BLD AUTO: 43 %
HGB BLD-MCNC: 14.5 G/DL
IMM GRANULOCYTES # BLD AUTO: 0.01 X10(3) UL (ref 0–1)
IMM GRANULOCYTES NFR BLD: 0.1 %
LYMPHOCYTES # BLD AUTO: 1.96 X10(3) UL (ref 1–4)
LYMPHOCYTES NFR BLD AUTO: 29.3 %
MCH RBC QN AUTO: 29.1 PG (ref 26–34)
MCHC RBC AUTO-ENTMCNC: 33.7 G/DL (ref 31–37)
MCV RBC AUTO: 86.2 FL
MONOCYTES # BLD AUTO: 0.43 X10(3) UL (ref 0.1–1)
MONOCYTES NFR BLD AUTO: 6.4 %
NEUTROPHILS # BLD AUTO: 3.72 X10 (3) UL (ref 1.5–7.7)
NEUTROPHILS # BLD AUTO: 3.72 X10(3) UL (ref 1.5–7.7)
NEUTROPHILS NFR BLD AUTO: 55.5 %
OSMOLALITY SERPL CALC.SUM OF ELEC: 291 MOSM/KG (ref 275–295)
P AXIS: 39 DEGREES
P AXIS: 47 DEGREES
P-R INTERVAL: 168 MS
P-R INTERVAL: 172 MS
PLATELET # BLD AUTO: 192 10(3)UL (ref 150–450)
POTASSIUM SERPL-SCNC: 4.4 MMOL/L (ref 3.5–5.1)
PROT SERPL-MCNC: 7.1 G/DL (ref 5.7–8.2)
Q-T INTERVAL: 394 MS
Q-T INTERVAL: 398 MS
QRS DURATION: 94 MS
QRS DURATION: 98 MS
QTC CALCULATION (BEZET): 410 MS
QTC CALCULATION (BEZET): 434 MS
R AXIS: 57 DEGREES
R AXIS: 70 DEGREES
RBC # BLD AUTO: 4.99 X10(6)UL
SODIUM SERPL-SCNC: 140 MMOL/L (ref 136–145)
T AXIS: 65 DEGREES
T AXIS: 69 DEGREES
TROPONIN I SERPL HS-MCNC: <3 NG/L
TROPONIN I SERPL HS-MCNC: <3 NG/L
VENTRICULAR RATE: 64 BPM
VENTRICULAR RATE: 73 BPM
WBC # BLD AUTO: 6.7 X10(3) UL (ref 4–11)

## 2025-03-14 PROCEDURE — 85025 COMPLETE CBC W/AUTO DIFF WBC: CPT

## 2025-03-14 PROCEDURE — 85025 COMPLETE CBC W/AUTO DIFF WBC: CPT | Performed by: STUDENT IN AN ORGANIZED HEALTH CARE EDUCATION/TRAINING PROGRAM

## 2025-03-14 PROCEDURE — 99285 EMERGENCY DEPT VISIT HI MDM: CPT

## 2025-03-14 PROCEDURE — 71045 X-RAY EXAM CHEST 1 VIEW: CPT

## 2025-03-14 PROCEDURE — 80053 COMPREHEN METABOLIC PANEL: CPT

## 2025-03-14 PROCEDURE — 84484 ASSAY OF TROPONIN QUANT: CPT

## 2025-03-14 PROCEDURE — 99284 EMERGENCY DEPT VISIT MOD MDM: CPT

## 2025-03-14 PROCEDURE — 93010 ELECTROCARDIOGRAM REPORT: CPT

## 2025-03-14 PROCEDURE — 93005 ELECTROCARDIOGRAM TRACING: CPT

## 2025-03-14 PROCEDURE — 36415 COLL VENOUS BLD VENIPUNCTURE: CPT

## 2025-03-14 PROCEDURE — 84484 ASSAY OF TROPONIN QUANT: CPT | Performed by: STUDENT IN AN ORGANIZED HEALTH CARE EDUCATION/TRAINING PROGRAM

## 2025-03-14 PROCEDURE — 80053 COMPREHEN METABOLIC PANEL: CPT | Performed by: STUDENT IN AN ORGANIZED HEALTH CARE EDUCATION/TRAINING PROGRAM

## 2025-03-14 NOTE — CONSULTS
[facility] Cardiology  Consultation Note      Mercedes Bacon Patient Status:  Emergency    1965 MRN [id number]   Location [facility] EMERGENCY DEPARTMENT Attending Ange Heart MD   Hosp Day # 0 PCP Chong Kline MD     Reason for consult: Chest pain    History of Present Illness:  Mercedes Bacon is a 59 year old female with HLD who presented to [facility] on 3/14/2025 with chest pain. Began earlier this morning, central nonradiating lasted 25 min then self resolved. Reports high stress. No associated symptoms such as nausea, diaphoresis, dyspnea, palps, syncope. No history of exertional symptoms.      Medications:  Current Facility-Administered Medications   Medication Dose Route Frequency    predniSONE (DELTASONE) tab 50 mg  50 mg Oral Once    diphenhydrAMINE HCl (BENADRYL) cap 50 mg  50 mg Oral Once    diphenhydrAMINE HCl (BENADRYL) cap 50 mg  50 mg Oral See Admin Instructions    predniSONE (DELTASONE) tab 10 mg  10 mg Oral Once       Past Medical History:    ANXIETY    Esophageal reflux    GERD    HEADACHES    MIGRAINES    Migraines    Osteopenia    Other and unspecified hyperlipidemia    OTHER DISEASES    SEVERE PMS SXS    OTHER DISEASES    HX OF HIGH PROLACTIN LEVEL    Prolactin increased    SEASONAL ALLERGIES       Past Surgical History:   Procedure Laterality Date    Colonoscopy  2020    MAC: 3 mm rectosigmoid polyp (lymphoid aggregate), int hem, rpt     D & c      Lasik Bilateral           x1,        Family History  family history includes Arrhythmia in her mother; Cancer in her father; Heart Disease in her sister; Lipids in her mother; Migraines in her mother; Stroke in her mother; tia in her sister.    Social History   reports that she has never smoked. She has never used smokeless tobacco. She reports current alcohol use. She reports that she does not use drugs.     Allergies  Allergies[1]    Review of Systems:  As per HPI, otherwise 10  point ROS is negative in detail.    Physical Exam:  Blood pressure 102/74, pulse 66, temperature 98.6 °F (37 °C), temperature source Temporal, resp. rate 20, height 61\", weight 130 lb (59 kg), SpO2 100%, not currently breastfeeding.  Temp (24hrs), Av.6 °F (37 °C), Min:98.6 °F (37 °C), Max:98.6 °F (37 °C)    Wt Readings from Last 3 Encounters:   25 130 lb (59 kg)   24 135 lb (61.2 kg)   21 129 lb (58.5 kg)       General: Awake and alert; in no acute distress  HEENT: Extraocular movements are intact; sclerae are anicteric; scalp is atraumatic  Neck: Supple; no JVD; no carotid bruits  Cardiac: Regular rate and regular rhythm; normal S1 and S2, no murmurs, rubs, or gallops are appreciated  Lungs: Clear to auscultation bilaterally; no accessory muscle use is noted, no wheezes, rhonci or rales  Abdomen: Soft, non-distended, non-tender; bowel sounds are normoactive  Extremities: Warm, no edema, clubbing or cyanosis; moves all 4 extremities normally, distal pulses intact and equal  Psychiatric: Normal mood and affect; answers questions appropriately  Dermatologic: No rashes; normal skin turgor    Diagnostic testing:    Labs:   No results found for: \"PT\", \"INR\"     Lab Results   Component Value Date    WBC 6.7 2025    HGB 14.5 2025    HCT 43.0 2025    .0 2025    CREATSERUM 0.77 2025    BUN 15 2025     2025    K 4.4 2025     2025    CO2 29.0 2025    GLU 96 2025    CA 9.6 2025    ALB 4.8 2025    ALKPHO 112 2025    BILT 0.7 2025    TP 7.1 2025    AST 22 2025    ALT 23 2025       Cardiac diagnostics:    EKG 3/14/2025: NSR, iRBBB    Stress Echo 11/3/22: Normal    Impression:  59 year old female presenting with atypical CP  Troponin and EKG neg   Pituitary adenoma on bromocriptine  HLD    Recommendations:  OK for dc. Will ask [facility] to contact her early next week for outpatient  evaluation.   Continue statin    Thank you for allowing our practice to participate in the care of your patient. Please do not hesitate to contact me if you have any questions.    Boris Moses MD  Interventional Cardiology  [facility]  Office: [phone]         [1]   Allergies  Allergen Reactions    Gnp Iodine ANAPHYLAXIS    Radiology Contrast Iodinated Dyes ANAPHYLAXIS and SHORTNESS OF BREATH    Dander      cats    Dust     Seasonal

## 2025-03-14 NOTE — DISCHARGE INSTRUCTIONS
Please return to the ED for worsening chest pain or discomfort     Otherwise follow up with   [facility]   [address]   [facility] [address] [phone]

## 2025-03-14 NOTE — ED INITIAL ASSESSMENT (HPI)
Pt presents to ed with chest pain that started this morning and has since subsided, pt denies any SOB

## 2025-03-14 NOTE — ED PROVIDER NOTES
Patient Seen in: [facility] Emergency Department      History     Chief Complaint   Patient presents with    Chest Pain Angina     Stated Complaint: chest pain    Subjective:   HPI      The patient is a 59-year-old female history of hyperlipidemia presented to the emergency room with reports of chest discomfort.  Symptoms started around 11:00 this afternoon.  They occurred while at rest.  She was actually at work in a meeting when she developed discomfort in her chest that lasted approximately 25 minutes.  Patient's blood pressure at the time was reportedly elevated at 140 and 150 systolic.  She notes her blood pressures never that elevated.  She denies any associated shortness of breath.  She felt like it may have radiated towards her jaw but she is unsure.  She has been chest pain-free since then.  Patient notes she walks her dog daily and never experiences chest pain on exertion.  She may have had a stress test years ago but does not recall.      Per chart review patient did have a stress echo in  which was normal.    Objective:     Past Medical History:    ANXIETY    Esophageal reflux    GERD    HEADACHES    MIGRAINES    Migraines    Osteopenia    Other and unspecified hyperlipidemia    OTHER DISEASES    SEVERE PMS SXS    OTHER DISEASES    HX OF HIGH PROLACTIN LEVEL    Prolactin increased    SEASONAL ALLERGIES              Past Surgical History:   Procedure Laterality Date    Colonoscopy  2020    MAC: 3 mm rectosigmoid polyp (lymphoid aggregate), int hem, rpt     D & c      Lasik Bilateral           x1,                 Social History     Socioeconomic History    Marital status:    Tobacco Use    Smoking status: Never    Smokeless tobacco: Never   Vaping Use    Vaping status: Never Used   Substance and Sexual Activity    Alcohol use: Yes     Comment: occasionally    Drug use: No    Sexual activity: Yes     Partners: Male   Other Topics Concern    Caffeine Concern No      Comment: 12oz coffee per day    Exercise Yes     Comment: walking                  Physical Exam     ED Triage Vitals [03/14/25 1230]   /64   Pulse 77   Resp 14   Temp 98.6 °F (37 °C)   Temp src Temporal   SpO2 98 %   O2 Device None (Room air)       Current Vitals:   Vital Signs  BP: 102/74  Pulse: 66  Resp: 20  Temp: 98.6 °F (37 °C)  Temp src: Temporal  MAP (mmHg): 83    Oxygen Therapy  SpO2: 100 %  O2 Device: None (Room air)        Physical Exam  Vitals and nursing note reviewed.   Constitutional:       General: She is not in acute distress.     Appearance: Normal appearance.   HENT:      Head: Normocephalic.      Nose: Nose normal.      Mouth/Throat:      Mouth: Mucous membranes are moist.   Eyes:      Extraocular Movements: Extraocular movements intact.      Pupils: Pupils are equal, round, and reactive to light.   Cardiovascular:      Rate and Rhythm: Normal rate and regular rhythm.      Pulses: Normal pulses.      Heart sounds: Normal heart sounds.   Pulmonary:      Effort: Pulmonary effort is normal.      Breath sounds: Normal breath sounds.   Abdominal:      General: Abdomen is flat. Bowel sounds are normal. There is no distension.      Palpations: Abdomen is soft.      Tenderness: There is no abdominal tenderness. There is no right CVA tenderness, left CVA tenderness, guarding or rebound.      Hernia: No hernia is present.   Musculoskeletal:         General: No swelling or tenderness. Normal range of motion.      Cervical back: Normal range of motion.   Skin:     General: Skin is warm and dry.   Neurological:      Mental Status: She is alert and oriented to person, place, and time. Mental status is at baseline.   Psychiatric:         Mood and Affect: Mood normal.             ED Course     Labs Reviewed   COMP METABOLIC PANEL (14) - Abnormal; Notable for the following components:       Result Value    A/G Ratio 2.1 (*)     All other components within normal limits   TROPONIN I HIGH SENSITIVITY - Normal    TROPONIN I HIGH SENSITIVITY - Normal   CBC WITH DIFFERENTIAL WITH PLATELET   RAINBOW DRAW LAVENDER   RAINBOW DRAW LIGHT GREEN   RAINBOW DRAW BLUE     EKG    Rate, intervals and axes as noted on EKG Report.  Rate: 73  Rhythm: Sinus Rhythm  Reading: no cherry/dep           EKG    Rate, intervals and axes as noted on EKG Report.  Rate: 64  Rhythm: Sinus Rhythm  Reading: unchanged from prior          XR CHEST AP PORTABLE  (CPT=71045)    Result Date: 3/14/2025  CONCLUSION:  Marked dextroscoliosis thoracic spine.  Heart size and pulmonary vascularity is within normal limits.  No straight, consolidation, effusion or pneumothorax.   LOCATION:  [id number]      Dictated by (CST): Joanie Daugherty MD on 3/14/2025 at 1:32 PM     Finalized by (CST): Joanie Daugherty MD on 3/14/2025 at 1:32 PM               [facility]          Medical Decision Making  The differential includes the following  Acute MI, anxiety reaction     Pertinent comorbidities include  As detailed above     Pertinent social history includes  As detailed above     Labs  Trop neg x 2     Imaging studies  I reviewed the images and my independent interpreation after review is as detailed above. Additionaly, I reviewd the radiology report that states the following     External data reviewed    Discussion of management with external providers  Discussed with Dr. oMses who will have patient follow-up at the cardiac evaluation center next week    ER course  Patient normotensive hemodynamically stable here in no acute distress.  She has been chest pain-free since arrival.  She has had 2 serial high-sensitivity troponins which were both negative and also 2 EKGs which showed no signs of acute ischemia.    Disposition and Plan     Clinical Impression:  1. Chest pain, atypical         Disposition:  Discharge  3/14/2025  3:49 pm    Follow-up:  Chong Kline MD  [address]  [address]  [place] [address]  [phone]    Follow up            Medications Prescribed:  Discharge  Medication List as of 3/14/2025  3:58 PM              Supplementary Documentation:

## (undated) NOTE — MR AVS SNAPSHOT
[address], [place] [id number]               Thank you for choosing us for your health care visit with Trinidad Colorado MD.  We are glad to serve you and happy to provide you with this mittal “request refill” option in your My Pick Box account. ? Refills are not addressed on weekends; covering physicians do not authorize routine medications on weekends.   ? No narcotics or controlled substances are refilled after noon on Fridays or by on call physi contact your insurance carrier to verify that your procedure/test has been approved and is a COVERED benefit.   Although the [facility] staff does its due diligence, the insurance carrier gives the disclaimer that \"Although the procedure is authorized, this does Visit [email or web address] to learn more.            Visit [facility] online at  [email or web address]

## (undated) NOTE — Clinical Note
Could you please help order pre-med for pt's allergy history per radiology? Thanks,.  Elisabeth Kinds